# Patient Record
Sex: FEMALE | Race: WHITE | Employment: PART TIME | URBAN - METROPOLITAN AREA
[De-identification: names, ages, dates, MRNs, and addresses within clinical notes are randomized per-mention and may not be internally consistent; named-entity substitution may affect disease eponyms.]

---

## 2021-05-26 ENCOUNTER — RECORDS - HEALTHEAST (OUTPATIENT)
Dept: ADMINISTRATIVE | Facility: CLINIC | Age: 46
End: 2021-05-26

## 2021-05-28 ENCOUNTER — RECORDS - HEALTHEAST (OUTPATIENT)
Dept: ADMINISTRATIVE | Facility: CLINIC | Age: 46
End: 2021-05-28

## 2021-05-29 ENCOUNTER — RECORDS - HEALTHEAST (OUTPATIENT)
Dept: ADMINISTRATIVE | Facility: CLINIC | Age: 46
End: 2021-05-29

## 2021-10-25 ENCOUNTER — TRANSFERRED RECORDS (OUTPATIENT)
Dept: HEALTH INFORMATION MANAGEMENT | Facility: CLINIC | Age: 46
End: 2021-10-25
Payer: COMMERCIAL

## 2021-11-08 ENCOUNTER — TRANSCRIBE ORDERS (OUTPATIENT)
Dept: MULTI SPECIALTY CLINIC | Facility: CLINIC | Age: 46
End: 2021-11-08
Payer: COMMERCIAL

## 2021-11-08 DIAGNOSIS — M25.571 ARTHRALGIA OF RIGHT FOOT: Primary | ICD-10-CM

## 2021-12-30 ENCOUNTER — HOSPITAL ENCOUNTER (OUTPATIENT)
Dept: RADIOLOGY | Facility: CLINIC | Age: 46
End: 2021-12-30
Attending: INTERNAL MEDICINE
Payer: COMMERCIAL

## 2021-12-30 ENCOUNTER — VIRTUAL VISIT (OUTPATIENT)
Dept: RHEUMATOLOGY | Facility: CLINIC | Age: 46
End: 2021-12-30
Payer: COMMERCIAL

## 2021-12-30 DIAGNOSIS — M25.50 POLYARTHRALGIA: Primary | ICD-10-CM

## 2021-12-30 DIAGNOSIS — R76.8 ANA POSITIVE: ICD-10-CM

## 2021-12-30 PROCEDURE — 73560 X-RAY EXAM OF KNEE 1 OR 2: CPT | Mod: 50

## 2021-12-30 PROCEDURE — 73130 X-RAY EXAM OF HAND: CPT | Mod: 50

## 2021-12-30 PROCEDURE — 99204 OFFICE O/P NEW MOD 45 MIN: CPT | Mod: 95 | Performed by: INTERNAL MEDICINE

## 2021-12-30 PROCEDURE — 73630 X-RAY EXAM OF FOOT: CPT | Mod: 50

## 2021-12-30 RX ORDER — ESCITALOPRAM OXALATE 5 MG/1
5 TABLET ORAL
COMMUNITY
Start: 2021-08-19

## 2021-12-30 RX ORDER — TRAZODONE HYDROCHLORIDE 50 MG/1
50-100 TABLET, FILM COATED ORAL
COMMUNITY
Start: 2021-09-16

## 2021-12-30 NOTE — PROGRESS NOTES
Meche is a 46 year old who is being evaluated via a billable video visit.      How would you like to obtain your AVS? Mail a copy  If the video visit is dropped, the invitation should be resent by: Text to cell phone: [phone]   Will anyone else be joining your video visit? No      Video Start Time: 8:09am  Video-Visit Details    Type of service:  Video Visit    Video End Time:8:28 AM    Originating Location (pt. Location): Home    Distant Location (provider location):  [facility]     Platform used for Video Visit: [place]-[employer]    This document was created using a software with less than 100% fidelity, at times resulting in unintended, even erroneous syntax and grammar.  The reader is advised to keep this under consideration while reviewing, interpreting this note.    ASSESSMENT AND PLAN:  Meche Gonzalez 46 year old female is seen here on 12/30/21 for evaluation of polyarthralgias, positive BONILLA, family history of rheumatoid arthritis.  She has few if any clear signals of an active connective tissue disease besides the joint symptoms.  Further work-up as noted.  On today's examination not possible to say if she has synovitis, will meet in person.  This is discussed with her.          Diagnoses and all orders for this visit:  Polyarthralgia  -     XR Foot Bilateral G/E 3 Views; Future  -     XR Hand Bilateral G/E 3 Views; Future  -     Complement C3; Future  -     Complement C4; Future  -     Erythrocyte sedimentation rate auto; Future  -     XR Knee AP/Lat Standing Bilateral; Future  -     DNA double stranded antibodies; Future  -     NANNETTE antibody panel; Future  BONILLA positive  -     XR Foot Bilateral G/E 3 Views; Future  -     XR Hand Bilateral G/E 3 Views; Future  -     Complement C3; Future  -     Complement C4; Future  -     Erythrocyte sedimentation rate auto; Future  -     XR Knee AP/Lat Standing Bilateral; Future  -     DNA double stranded antibodies; Future  -     NANNETTE antibody panel;  Future    HISTORY OF PRESENTING ILLNESS:  Meche Gonzalez, 46 year old, female is here for evaluation of painful joints, work-up at her primary physician reveal a positive BONILLA and a minimally elevated sed rate, and a normal CRP normal rheumatoid factor and anti-CCP antibodies.  She has noted pain in her joints such as hands, wrists, ankles going back 3 years.  The symptoms are intermittent, sometimes she wonders if there is swelling in her fingers.  She has noted that there is no diurnal variation.  The days that she gets the pain pretty much the whole day she is hurting.  This is would typically trouble her more in the cold weather.  During the past few months she has noted additional pain which is affecting her toes hips and the knees.  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  She takes ibuprofen with some benefit.    She describes no features suggestive of dactylitis.  She reports no rash, mouth ulcers, photosensitivity, DVT PE, pleuritic symptoms, and no history of miscarriages, no history of seizure disorder.  She has longstanding left shoulder pain for which she has undergone physical therapy.  She is not a smoker occasional alcohol describes herself otherwise in good health.  There is no family or personal history of psoriasis ulcerative colitis Crohn's disease.  Has prostate cancer mother adenocarcinoma of lung.      ALLERGIES:Ajztuke-jllnkwccb-mfnhzlgdjmtn [isometheptene-apap-dichloral]    PAST MEDICAL/ACTIVE PROBLEMS/MEDICATION/ FAMILY HISTORY/SOCIAL DATA:  The patient has a family history of  No past medical history on file.  History   Smoking Status     Not on file   Smokeless Tobacco     Not on file     Patient Active Problem List   Diagnosis     Menorrhagia     Current Outpatient Medications   Medication Sig Dispense Refill      escitalopram (LEXAPRO) 5 MG tablet Take 5 mg by mouth       traZODone (DESYREL) 50 MG tablet Take  mg by mouth         COMPREHENSIVE EXAMINATION:  There were no vitals filed for this visit.  A well appearing alert oriented female. Well appearing alert oriented.   Examination of the eyes revealed no redness, obvious scleromalacia.  ENT examination shows no nasal deformity such as of saddle type, external ear without signs of inflamm/deformity ation.  Cardiopulmonary examination without obvious signs of dyspnea, no wheezing is audible, no cyanosis.  There is no finger clubbing.  Skin examination performed for heliotrope, malar area eruption periungual erythema, lupus pernio.  Neurological examination shows the speech is fluent, no facial asymmetry, muscle power in the upper extremities proximally appears to be normal.  In the psychiatric examination the memory, orientation, attention, affect were observable and normal.  Joint examination of the DIPs, PIPs, MCPs, IP joints of the thumbs, wrists, elbows, for swelling, range of motion, for shoulders range of motion evaluated.  No swelling of the digits able to fully flex them into face wrist elbow shoulder range of motion is full.    LAB / IMAGING DATA:  No results found for: ALT, ALBUMIN, CREATININE    No results found for: WBC, HGB, PLT    No results found for: BONILLA

## 2021-12-31 ENCOUNTER — LAB (OUTPATIENT)
Dept: LAB | Facility: CLINIC | Age: 46
End: 2021-12-31
Payer: COMMERCIAL

## 2021-12-31 DIAGNOSIS — M25.50 POLYARTHRALGIA: ICD-10-CM

## 2021-12-31 DIAGNOSIS — R76.8 ANA POSITIVE: ICD-10-CM

## 2021-12-31 LAB
C3 SERPL-MCNC: 117 MG/DL (ref 83–177)
C4 SERPL-MCNC: 39 MG/DL (ref 19–59)
ERYTHROCYTE [SEDIMENTATION RATE] IN BLOOD BY WESTERGREN METHOD: 16 MM/HR (ref 0–20)

## 2021-12-31 PROCEDURE — 86225 DNA ANTIBODY NATIVE: CPT

## 2021-12-31 PROCEDURE — 86235 NUCLEAR ANTIGEN ANTIBODY: CPT

## 2021-12-31 PROCEDURE — 86160 COMPLEMENT ANTIGEN: CPT

## 2021-12-31 PROCEDURE — 85652 RBC SED RATE AUTOMATED: CPT

## 2021-12-31 PROCEDURE — 36415 COLL VENOUS BLD VENIPUNCTURE: CPT

## 2022-02-01 ENCOUNTER — OFFICE VISIT (OUTPATIENT)
Dept: RHEUMATOLOGY | Facility: CLINIC | Age: 47
End: 2022-02-01
Payer: COMMERCIAL

## 2022-02-01 VITALS
WEIGHT: 157.2 LBS | SYSTOLIC BLOOD PRESSURE: 116 MMHG | HEART RATE: 88 BPM | HEIGHT: 62 IN | BODY MASS INDEX: 28.93 KG/M2 | DIASTOLIC BLOOD PRESSURE: 74 MMHG

## 2022-02-01 DIAGNOSIS — M25.50 POLYARTHRALGIA: Primary | ICD-10-CM

## 2022-02-01 DIAGNOSIS — M65.941 TENOSYNOVITIS OF RIGHT HAND: ICD-10-CM

## 2022-02-01 DIAGNOSIS — R76.8 ANA POSITIVE: ICD-10-CM

## 2022-02-01 PROCEDURE — 99214 OFFICE O/P EST MOD 30 MIN: CPT | Performed by: INTERNAL MEDICINE

## 2022-02-01 RX ORDER — HYDROXYCHLOROQUINE SULFATE 200 MG/1
200 TABLET, FILM COATED ORAL 2 TIMES DAILY
Qty: 60 TABLET | Refills: 3 | Status: SHIPPED | OUTPATIENT
Start: 2022-02-01 | End: 2022-03-03

## 2022-02-01 ASSESSMENT — MIFFLIN-ST. JEOR: SCORE: 1296.3

## 2022-02-01 NOTE — PROGRESS NOTES
Rheumatology follow-up visit note     Meche is a 46 year old female presents today for follow-up.    Meche was seen today for recheck.    Diagnoses and all orders for this visit:    Polyarthralgia  -     hydroxychloroquine (PLAQUENIL) 200 MG tablet; Take 1 tablet (200 mg) by mouth 2 times daily    BONILLA positive  -     hydroxychloroquine (PLAQUENIL) 200 MG tablet; Take 1 tablet (200 mg) by mouth 2 times daily    Tenosynovitis of right hand  -     hydroxychloroquine (PLAQUENIL) 200 MG tablet; Take 1 tablet (200 mg) by mouth 2 times daily        This patient with polyarthralgia, positive BONILLA, has synovitis in her right wrist however that is only of 2-week duration by her symptoms, different from the pain that she had described during a virtual visit recently. She is also been found to have evidence of osteoarthritis such as the first CMC's and minimally so in the knees. Today we discussed these issues. I have asked her to consider taking hydroxychloroquine the latent duration was outlined, side effects including ocular cutaneous GI were reviewed, and literature provided. Need for eye exam should she be on it for long-term was discussed. We will meet here in the next 4 to 5 months or sooner.    Follow up in 4 months    HPI    Meche Gonzalez is a 46 year old female is here for follow-up of painful joints, work-up at her primary physician reveal a positive BONILLA and a minimally elevated sed rate, and a normal CRP normal rheumatoid factor and anti-CCP antibodies. Today she noted pain in her right wrist that is troubled her for the past couple of weeks. This is different from what she describes on the virtual visit she is also noted that her right index finger is hard for her to bend these are worse in the morning. She also noted pain in the first MTP especially the left side. Her recent labs are reviewed. NANNETTE's complements dsDNA are all negative. X-rays of the hands and knees suggest osteoarthritis. She has noted  "pain in her joints such as hands, wrists, ankles going back 3 years.  The symptoms are intermittent, sometimes she wonders if there is swelling in her fingers.  She has noted that there is no diurnal variation.  The days that she gets the pain pretty much the whole day she is hurting.  This is would typically trouble her more in the cold weather.  During the past few months she has noted additional pain which is affecting her toes hips and the knees.  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  She takes ibuprofen with some benefit.    She describes no features suggestive of dactylitis.  She reports no rash, mouth ulcers, photosensitivity, DVT PE, pleuritic symptoms, and no history of miscarriages, no history of seizure disorder.  She has longstanding left shoulder pain for which she has undergone physical therapy.  She is not a smoker occasional alcohol describes herself otherwise in good health.  There is no family or personal history of psoriasis ulcerative colitis Crohn's disease.  Has prostate cancer mother adenocarcinoma of lung.        DETAILED EXAMINATION  02/01/22  :    Vitals:    02/01/22 1249   BP: 116/74   BP Location: Right arm   Patient Position: Sitting   Cuff Size: Adult Regular   Pulse: 88   Weight: 71.3 kg (157 lb 3.2 oz)   Height: 1.575 m (5' 2\")     Alert oriented. Head including the face is examined for malar rash, heliotropes, scarring, lupus pernio. Eyes examined for redness such as in episcleritis/scleritis, periorbital lesions.   Neck/ Face examined for parotid gland swelling, range of motion of neck.  Left upper and lower and right upper and lower extremities examined for tenderness, swelling, warmth of the appendicular joints, range of motion, edema, rash.  Some of the important findings included: She has synovitis in her wrist on " the right side, and she has tenosynovitis of the right index flexor tendon, without dactylitis. He does not have nail pitting. She does not have dactylitis of the toes or enthesitis. She had mild tenderness in metatarsophalangeal joints without swelling.  Patient Active Problem List    Diagnosis Date Noted     Menorrhagia      Priority: Medium     Created by Conversion         Past Surgical History:   Procedure Laterality Date     [facility]  DELIVERY ONLY      Description:  Section;  Proc Date: 2005;  Comments: @32 wks; -Son ; +CMV      No past medical history on file.  Allergies   Allergen Reactions     Cprxexd-Otaodkvot-Txyrpigxdqzm [Isometheptene-Apap-Dichloral] Unknown     Current Outpatient Medications   Medication Sig Dispense Refill     escitalopram (LEXAPRO) 5 MG tablet Take 5 mg by mouth       Loratadine (CLARITIN PO) Take 1 tablet by mouth daily       traZODone (DESYREL) 50 MG tablet Take  mg by mouth       family history is not on file.  Social Connections: Not on file          Erythrocyte Sedimentation Rate   Date Value Ref Range Status   2021 16 0 - 20 mm/hr Final